# Patient Record
Sex: FEMALE | Race: WHITE | NOT HISPANIC OR LATINO | Employment: PART TIME | URBAN - METROPOLITAN AREA
[De-identification: names, ages, dates, MRNs, and addresses within clinical notes are randomized per-mention and may not be internally consistent; named-entity substitution may affect disease eponyms.]

---

## 2017-11-25 ENCOUNTER — HISTORICAL (OUTPATIENT)
Dept: LAB | Facility: HOSPITAL | Age: 52
End: 2017-11-25

## 2017-11-25 LAB
ABS NEUT (OLG): 3.73
ALBUMIN SERPL-MCNC: 3.5 GM/DL (ref 3.4–5)
ALP SERPL-CCNC: 103 UNIT/L (ref 46–116)
ALT SERPL-CCNC: 19 UNIT/L (ref 12–78)
AST SERPL-CCNC: 8 UNIT/L (ref 10–37)
BASOPHILS # BLD AUTO: 0.04 X10(3)/MCL
BASOPHILS NFR BLD AUTO: 0.6 %
BILIRUB SERPL-MCNC: 0.2 MG/DL (ref 0.2–1)
BILIRUBIN DIRECT+TOT PNL SERPL-MCNC: 0.07 MG/DL (ref 0–0.2)
BILIRUBIN DIRECT+TOT PNL SERPL-MCNC: 0.17 MG/DL
BUN SERPL-MCNC: 15 MG/DL (ref 7–18)
CALCIUM SERPL-MCNC: 9 MG/DL (ref 8.5–10.1)
CHLORIDE SERPL-SCNC: 106 MMOL/L (ref 98–107)
CHOLEST SERPL-MCNC: 244 MG/DL (ref 50–200)
CHOLEST/HDLC SERPL: 4 {RATIO} (ref 0–5)
CO2 SERPL-SCNC: 27.9 MMOL/L (ref 21–32)
CREAT SERPL-MCNC: 0.73 MG/DL (ref 0.55–1.02)
EOSINOPHIL # BLD AUTO: 0.16 X10(3)/MCL
EOSINOPHIL NFR BLD AUTO: 2.2 %
ERYTHROCYTE [DISTWIDTH] IN BLOOD BY AUTOMATED COUNT: 14 %
GLUCOSE SERPL-MCNC: 91 MG/DL (ref 74–106)
HCT VFR BLD AUTO: 40.2 % (ref 34–46)
HDLC SERPL-MCNC: 55 MG/DL (ref 35–60)
HGB BLD-MCNC: 12.9 GM/DL (ref 11.3–15.4)
IMM GRANULOCYTES # BLD AUTO: 0 10*3/UL (ref 0–0.1)
IMM GRANULOCYTES NFR BLD AUTO: 0 % (ref 0–1)
LDLC SERPL CALC-MCNC: 175 MG/DL (ref 50–140)
LYMPHOCYTES # BLD AUTO: 2.91 X10(3)/MCL
LYMPHOCYTES NFR BLD AUTO: 40.5 %
MCH RBC QN AUTO: 29.3 PG (ref 27–33)
MCHC RBC AUTO-ENTMCNC: 32.1 GM/DL (ref 32–35)
MCV RBC AUTO: 91.2 FL (ref 81–97)
MONOCYTES # BLD AUTO: 0.35 X10(3)/MCL
MONOCYTES NFR BLD AUTO: 4.9 %
NEUTROPHILS # BLD AUTO: 3.73 X10(3)/MCL
NEUTROPHILS NFR BLD AUTO: 51.8 %
PLATELET # BLD AUTO: 269 X10(3)/MCL (ref 151–368)
PMV BLD AUTO: 11 FL
POTASSIUM SERPL-SCNC: 4.3 MMOL/L (ref 3.5–5.1)
PROT SERPL-MCNC: 6.8 GM/DL (ref 6.4–8.2)
RBC # BLD AUTO: 4.41 X10(6)/MCL (ref 3.9–5)
SODIUM SERPL-SCNC: 143 MMOL/L (ref 136–145)
TRIGL SERPL-MCNC: 70 MG/DL (ref 30–150)
TSH SERPL-ACNC: 1.07 MIU/ML (ref 0.35–3.75)
VLDLC SERPL CALC-MCNC: 14 MG/DL
WBC # SPEC AUTO: 7.19 X10(3)/MCL (ref 3.4–9.2)

## 2018-02-26 ENCOUNTER — HISTORICAL (OUTPATIENT)
Dept: LAB | Facility: HOSPITAL | Age: 53
End: 2018-02-26

## 2018-02-26 LAB
ALBUMIN SERPL-MCNC: 3.6 GM/DL (ref 3.4–5)
ALP SERPL-CCNC: 113 UNIT/L (ref 46–116)
ALT SERPL-CCNC: 23 UNIT/L (ref 12–78)
AST SERPL-CCNC: 8 UNIT/L (ref 10–37)
BILIRUB SERPL-MCNC: 0.5 MG/DL (ref 0.2–1)
BILIRUBIN DIRECT+TOT PNL SERPL-MCNC: 0.1 MG/DL (ref 0–0.2)
BILIRUBIN DIRECT+TOT PNL SERPL-MCNC: 0.41 MG/DL
CHOLEST SERPL-MCNC: 157 MG/DL (ref 50–200)
CHOLEST/HDLC SERPL: 3 {RATIO} (ref 0–5)
FT4I SERPL CALC-MCNC: 3 (ref 3–4)
HDLC SERPL-MCNC: 47 MG/DL (ref 35–60)
LDLC SERPL CALC-MCNC: 89 MG/DL (ref 50–140)
PROT SERPL-MCNC: 7.2 GM/DL (ref 6.4–8.2)
T3RU NFR SERPL: 32 % (ref 31–39)
T4 SERPL-MCNC: 7.9 MCG/DL (ref 4.7–13.3)
TRIGL SERPL-MCNC: 105 MG/DL (ref 30–150)
TSH SERPL-ACNC: 1.28 MIU/ML (ref 0.35–3.75)
VLDLC SERPL CALC-MCNC: 21 MG/DL

## 2021-06-23 ENCOUNTER — HISTORICAL (OUTPATIENT)
Dept: LAB | Facility: HOSPITAL | Age: 56
End: 2021-06-23

## 2021-06-23 LAB
ABS NEUT (OLG): 2.59
ALBUMIN SERPL-MCNC: 3.8 GM/DL (ref 3.5–5)
ALBUMIN/GLOB SERPL: 1.3 RATIO (ref 1.1–2)
ALP SERPL-CCNC: 81 UNIT/L (ref 40–150)
ALT SERPL-CCNC: 18 UNIT/L (ref 0–55)
AST SERPL-CCNC: 16 UNIT/L (ref 5–34)
BASOPHILS # BLD AUTO: 0.02 X10(3)/MCL
BASOPHILS NFR BLD AUTO: 0.4 %
BILIRUB SERPL-MCNC: 0.4 MG/DL
BILIRUBIN DIRECT+TOT PNL SERPL-MCNC: 0.2 MG/DL
BILIRUBIN DIRECT+TOT PNL SERPL-MCNC: 0.2 MG/DL (ref 0–0.5)
BUN SERPL-MCNC: 16 MG/DL (ref 9.8–20.1)
CALCIUM SERPL-MCNC: 9 MG/DL (ref 8.4–10.2)
CHLORIDE SERPL-SCNC: 107 MMOL/L (ref 98–107)
CHOLEST SERPL-MCNC: 156 MG/DL
CHOLEST/HDLC SERPL: 4 {RATIO} (ref 0–5)
CO2 SERPL-SCNC: 30 MEQ/L (ref 22–29)
CREAT SERPL-MCNC: 0.72 MG/DL (ref 0.55–1.02)
DEPRECATED CALCIDIOL+CALCIFEROL SERPL-MC: 51.1 NG/ML (ref 30–80)
EOSINOPHIL # BLD AUTO: 0.16 X10(3)/MCL
EOSINOPHIL NFR BLD AUTO: 2.8 %
ERYTHROCYTE [DISTWIDTH] IN BLOOD BY AUTOMATED COUNT: 14 %
GLOBULIN SER-MCNC: 2.9 GM/DL (ref 2.4–3.5)
GLUCOSE SERPL-MCNC: 106 MG/DL (ref 74–100)
HCT VFR BLD AUTO: 38.7 % (ref 34–46)
HDLC SERPL-MCNC: 37 MG/DL (ref 35–60)
HGB BLD-MCNC: 12.3 GM/DL (ref 11.3–15.4)
IMM GRANULOCYTES # BLD AUTO: 0.03 10*3/UL (ref 0–0.1)
IMM GRANULOCYTES NFR BLD AUTO: 0.5 % (ref 0–1)
LDLC SERPL CALC-MCNC: 95 MG/DL (ref 50–140)
LYMPHOCYTES # BLD AUTO: 2.51 X10(3)/MCL
LYMPHOCYTES NFR BLD AUTO: 44.5 %
MCH RBC QN AUTO: 30.1 PG (ref 27–33)
MCHC RBC AUTO-ENTMCNC: 31.8 GM/DL (ref 32–35)
MCV RBC AUTO: 94.6 FL (ref 81–97)
MONOCYTES # BLD AUTO: 0.33 X10(3)/MCL
MONOCYTES NFR BLD AUTO: 5.9 %
NEUTROPHILS # BLD AUTO: 2.59 X10(3)/MCL
NEUTROPHILS NFR BLD AUTO: 45.9 %
PLATELET # BLD AUTO: 290 X10(3)/MCL (ref 140–450)
PMV BLD AUTO: 11 FL
POTASSIUM SERPL-SCNC: 4.5 MMOL/L (ref 3.5–5.1)
PROT SERPL-MCNC: 6.7 GM/DL (ref 6.4–8.3)
RBC # BLD AUTO: 4.09 X10(6)/MCL (ref 3.9–5)
SODIUM SERPL-SCNC: 144 MMOL/L (ref 136–145)
TRIGL SERPL-MCNC: 118 MG/DL (ref 37–140)
TSH SERPL-ACNC: 1.39 UIU/ML (ref 0.35–4.94)
VLDLC SERPL CALC-MCNC: 24 MG/DL
WBC # SPEC AUTO: 5.64 X10(3)/MCL (ref 3.4–9.2)

## 2022-12-15 LAB
HUMAN PAPILLOMAVIRUS (HPV): NORMAL
PAP RECOMMENDATION EXT: NORMAL
PAP SMEAR: NORMAL

## 2023-05-02 ENCOUNTER — PATIENT OUTREACH (OUTPATIENT)
Dept: ADMINISTRATIVE | Facility: HOSPITAL | Age: 58
End: 2023-05-02
Payer: MEDICAID

## 2023-05-02 NOTE — LETTER
AUTHORIZATION FOR RELEASE OF   CONFIDENTIAL INFORMATION    Dear Radha Corona,    We are seeing Lorraine Yanes, date of birth 1965, in the clinic at [facility], [place], operated by [facility]. Gallo Jasso MD, is the patient's PCP. Lorraine Yanes has an outstanding lab/procedure at the time we reviewed her chart. In order to help keep her health information updated, she has authorized us to request the following medical record(s):        ( X )  MAMMOGRAM                                      (  )  COLONOSCOPY      (  )  PAP SMEAR                                          (  )  OUTSIDE LAB RESULTS     (  )  DEXA SCAN                                          (  )  EYE EXAM            (  )  FOOT EXAM                                          (  )  ENTIRE RECORD     (  )  OUTSIDE IMMUNIZATIONS                 (  )  _______________         Please fax records to [facility], Scott Bergeaux, MD, [phone]     If you have any questions you can contact Yakelin Bell at [phone].           Patient Name: Lorraine Yanes  : 1965  Patient Phone #: [phone]

## 2023-05-02 NOTE — PROGRESS NOTES
Patient returned my call reporting that she had a total Hyst 2 years ago. Added info to history. Patient is scheduled for mmg tomorrow at [facility]. Reminder set. Patient has never had colonoscopy.

## 2023-05-02 NOTE — PROGRESS NOTES
Population Health. Out Reach. Reviewing patient's chart for quality metrics. I attempted to contact patient to see if she has had a recent mmg, pap smear, or colonoscopy. No answer. Left Message.

## 2023-05-09 LAB — BCS RECOMMENDATION EXT: NORMAL

## 2023-05-09 NOTE — PROGRESS NOTES
The following record(s)  below were uploaded for Health Maintenance .    MAMMOGRAM SCREENING      5/3/23

## 2023-05-12 ENCOUNTER — OFFICE VISIT (OUTPATIENT)
Dept: FAMILY MEDICINE | Facility: CLINIC | Age: 58
End: 2023-05-12
Payer: MEDICAID

## 2023-05-12 VITALS
WEIGHT: 170.38 LBS | OXYGEN SATURATION: 65 % | HEIGHT: 63 IN | RESPIRATION RATE: 20 BRPM | SYSTOLIC BLOOD PRESSURE: 118 MMHG | BODY MASS INDEX: 30.19 KG/M2 | TEMPERATURE: 97 F | DIASTOLIC BLOOD PRESSURE: 80 MMHG | HEART RATE: 98 BPM

## 2023-05-12 DIAGNOSIS — Z12.11 COLON CANCER SCREENING: ICD-10-CM

## 2023-05-12 DIAGNOSIS — I10 HYPERTENSION, UNSPECIFIED TYPE: Primary | ICD-10-CM

## 2023-05-12 DIAGNOSIS — E78.49 OTHER HYPERLIPIDEMIA: ICD-10-CM

## 2023-05-12 DIAGNOSIS — F17.210 CIGARETTE SMOKER: ICD-10-CM

## 2023-05-12 PROCEDURE — 3074F PR MOST RECENT SYSTOLIC BLOOD PRESSURE < 130 MM HG: ICD-10-PCS | Mod: CPTII,,, | Performed by: FAMILY MEDICINE

## 2023-05-12 PROCEDURE — 99406 PR TOBACCO USE CESSATION INTERMEDIATE 3-10 MINUTES: ICD-10-PCS | Mod: ,,, | Performed by: FAMILY MEDICINE

## 2023-05-12 PROCEDURE — 99406 BEHAV CHNG SMOKING 3-10 MIN: CPT | Mod: ,,, | Performed by: FAMILY MEDICINE

## 2023-05-12 PROCEDURE — 1160F PR REVIEW ALL MEDS BY PRESCRIBER/CLIN PHARMACIST DOCUMENTED: ICD-10-PCS | Mod: CPTII,,, | Performed by: FAMILY MEDICINE

## 2023-05-12 PROCEDURE — 3008F BODY MASS INDEX DOCD: CPT | Mod: CPTII,,, | Performed by: FAMILY MEDICINE

## 2023-05-12 PROCEDURE — 1159F MED LIST DOCD IN RCRD: CPT | Mod: CPTII,,, | Performed by: FAMILY MEDICINE

## 2023-05-12 PROCEDURE — 3079F PR MOST RECENT DIASTOLIC BLOOD PRESSURE 80-89 MM HG: ICD-10-PCS | Mod: CPTII,,, | Performed by: FAMILY MEDICINE

## 2023-05-12 PROCEDURE — 3079F DIAST BP 80-89 MM HG: CPT | Mod: CPTII,,, | Performed by: FAMILY MEDICINE

## 2023-05-12 PROCEDURE — 4010F ACE/ARB THERAPY RXD/TAKEN: CPT | Mod: CPTII,,, | Performed by: FAMILY MEDICINE

## 2023-05-12 PROCEDURE — 3074F SYST BP LT 130 MM HG: CPT | Mod: CPTII,,, | Performed by: FAMILY MEDICINE

## 2023-05-12 PROCEDURE — 99204 OFFICE O/P NEW MOD 45 MIN: CPT | Mod: 25,,, | Performed by: FAMILY MEDICINE

## 2023-05-12 PROCEDURE — 1160F RVW MEDS BY RX/DR IN RCRD: CPT | Mod: CPTII,,, | Performed by: FAMILY MEDICINE

## 2023-05-12 PROCEDURE — 4010F PR ACE/ARB THEARPY RXD/TAKEN: ICD-10-PCS | Mod: CPTII,,, | Performed by: FAMILY MEDICINE

## 2023-05-12 PROCEDURE — 3008F PR BODY MASS INDEX (BMI) DOCUMENTED: ICD-10-PCS | Mod: CPTII,,, | Performed by: FAMILY MEDICINE

## 2023-05-12 PROCEDURE — 1159F PR MEDICATION LIST DOCUMENTED IN MEDICAL RECORD: ICD-10-PCS | Mod: CPTII,,, | Performed by: FAMILY MEDICINE

## 2023-05-12 PROCEDURE — 99204 PR OFFICE/OUTPT VISIT, NEW, LEVL IV, 45-59 MIN: ICD-10-PCS | Mod: 25,,, | Performed by: FAMILY MEDICINE

## 2023-05-12 RX ORDER — CARVEDILOL 3.12 MG/1
3.12 TABLET ORAL DAILY
Qty: 30 TABLET | Refills: 3 | Status: SHIPPED | OUTPATIENT
Start: 2023-05-12 | End: 2024-01-22 | Stop reason: SDUPTHER

## 2023-05-12 RX ORDER — LISINOPRIL 10 MG/1
10 TABLET ORAL
COMMUNITY
Start: 2023-02-13 | End: 2023-05-12 | Stop reason: SDUPTHER

## 2023-05-12 RX ORDER — ROSUVASTATIN CALCIUM 20 MG/1
20 TABLET, COATED ORAL NIGHTLY
COMMUNITY
Start: 2023-02-13 | End: 2023-05-12 | Stop reason: SDUPTHER

## 2023-05-12 RX ORDER — CARVEDILOL 3.12 MG/1
3.12 TABLET ORAL
COMMUNITY
Start: 2023-02-13 | End: 2023-05-12 | Stop reason: SDUPTHER

## 2023-05-12 RX ORDER — ROSUVASTATIN CALCIUM 20 MG/1
20 TABLET, COATED ORAL NIGHTLY
Qty: 30 TABLET | Refills: 3 | Status: SHIPPED | OUTPATIENT
Start: 2023-05-12 | End: 2024-01-22 | Stop reason: SDUPTHER

## 2023-05-12 RX ORDER — LISINOPRIL 10 MG/1
10 TABLET ORAL DAILY
Qty: 30 TABLET | Refills: 3 | Status: SHIPPED | OUTPATIENT
Start: 2023-05-12 | End: 2024-01-22 | Stop reason: SDUPTHER

## 2023-05-12 NOTE — LETTER
AUTHORIZATION FOR RELEASE OF   CONFIDENTIAL INFORMATION    Dear Dr Bucio,    We are seeing Lorraine Yanes, date of birth 1965, in the clinic at [facility]. Gallo Jasso MD is the patient's PCP. Lorraine Yanes has an outstanding lab/procedure at the time we reviewed her chart. In order to help keep her health information updated, she has authorized us to request the following medical record(s):        (  x)  MAMMOGRAM                                      (  )  COLONOSCOPY      ( x )  PAP SMEAR                                          (  )  OUTSIDE LAB RESULTS     (  )  DEXA SCAN                                          (  )  EYE EXAM            (  )  FOOT EXAM                                          (  )  ENTIRE RECORD     (  )  OUTSIDE IMMUNIZATIONS                 (  x)  _surgery note hysterectomy___         Please fax records to [facility], Scott James Bergeaux, MD, [phone]          Patient Name: Lorraine Yanes  : 1965  Patient Phone #: [phone]

## 2023-05-12 NOTE — PROGRESS NOTES
"Subjective:       Patient ID: Lorraine Yanes is a 57 y.o. female.    Chief Complaint: Establish Care (Mammo done at [facility]- requested already)      Establish care    Hypertension  - tolerating medication (no side effects)  - no headaches  - patient without any complaints    Hyperlipidemia  - tolerating medication (no side effects)   - no myalgia  - watching diet    Review of Systems   Constitutional: Negative.    Respiratory: Negative.     Cardiovascular: Negative.         Followed by Dr. Siddiqi in the past but no longer taking current insurance    Gastrointestinal: Negative.    Genitourinary:         Annual Juanis with Dr. Bucio; history of total hysterectomy   Psychiatric/Behavioral: Negative.          Tobacco use: 0.5 pk/day/40 years         Objective:      /80 (BP Location: Left arm, Patient Position: Sitting, BP Method: Large (Manual))   Pulse 98   Temp 96.5 °F (35.8 °C)   Resp 20   Ht 5' 3" (1.6 m)   Wt 77.3 kg (170 lb 6.4 oz)   SpO2 (!) 65%   BMI 30.19 kg/m²    Physical Exam  Constitutional:       Appearance: Normal appearance.   Cardiovascular:      Rate and Rhythm: Normal rate and regular rhythm.   Pulmonary:      Effort: Pulmonary effort is normal.      Breath sounds: Normal breath sounds.   Abdominal:      General: Abdomen is flat. Bowel sounds are normal.      Palpations: Abdomen is soft.   Skin:     General: Skin is warm.   Neurological:      Mental Status: She is alert.   Psychiatric:         Mood and Affect: Mood normal.         Behavior: Behavior normal.         Thought Content: Thought content normal.         Judgment: Judgment normal.             Assessment:       Problem List Items Addressed This Visit          Cardiac/Vascular    Other hyperlipidemia    Relevant Medications    rosuvastatin (CRESTOR) 20 MG tablet    Hypertension - Primary    Relevant Medications    carvediloL (COREG) 3.125 MG tablet    lisinopriL 10 MG tablet    Other Relevant Orders    Ambulatory " referral/consult to Cardiology     Other Visit Diagnoses       Cigarette smoker        Relevant Orders    CT Chest Lung Screening Low Dose    Colon cancer screening        Relevant Orders    Cologuard Screening (Multitarget Stool DNA)                 Plan:   1. Hypertension, unspecified type  -     Ambulatory referral/consult to Cardiology; Future; Expected date: 05/19/2023  -     carvediloL (COREG) 3.125 MG tablet; Take 1 tablet (3.125 mg total) by mouth once daily.  Dispense: 30 tablet; Refill: 3  -     lisinopriL 10 MG tablet; Take 1 tablet (10 mg total) by mouth once daily.  Dispense: 30 tablet; Refill: 3  Continue current medication  Rx sent to pharmacy   Refer patient to NP Nini    2. Other hyperlipidemia  -     rosuvastatin (CRESTOR) 20 MG tablet; Take 1 tablet (20 mg total) by mouth every evening.  Dispense: 30 tablet; Refill: 3  Continue current medication  Rx sent to pharmacy   Get copy of most recent FLP     3. Cigarette smoker  -     CT Chest Lung Screening Low Dose; Future; Expected date: 05/12/2023  Adverse affects of tobacco discussed; 5 minutes spent counseling  Patient defers medication/tobacco cessation program  Patient encouraged to remain abstinent from tobacco products  Return to clinic with any concerns    4. Colon cancer screening  -     Cologuard Screening (Multitarget Stool DNA); Future; Expected date: 05/12/2023  Patient defers colonoscopy; schedule Cologuard

## 2023-05-23 ENCOUNTER — HOSPITAL ENCOUNTER (OUTPATIENT)
Dept: RADIOLOGY | Facility: HOSPITAL | Age: 58
Discharge: HOME | End: 2023-05-23
Attending: FAMILY MEDICINE
Payer: MEDICAID

## 2023-05-23 DIAGNOSIS — F17.210 CIGARETTE SMOKER: ICD-10-CM

## 2023-05-23 PROCEDURE — 71271 CT THORAX LUNG CANCER SCR C-: CPT | Mod: TC

## 2023-05-24 ENCOUNTER — TELEPHONE (OUTPATIENT)
Dept: FAMILY MEDICINE | Facility: CLINIC | Age: 58
End: 2023-05-24
Payer: MEDICAID

## 2023-05-24 NOTE — TELEPHONE ENCOUNTER
----- Message from Gallo Jasso MD sent at 5/23/2023 12:28 PM CDT -----  Normal lung cancer screening.  Repeat in 1 year

## 2023-05-28 LAB — NONINV COLON CA DNA+OCC BLD SCRN STL QL: NEGATIVE

## 2023-05-30 ENCOUNTER — TELEPHONE (OUTPATIENT)
Dept: FAMILY MEDICINE | Facility: CLINIC | Age: 58
End: 2023-05-30
Payer: MEDICAID

## 2023-05-30 NOTE — TELEPHONE ENCOUNTER
----- Message from Gallo Jasso MD sent at 5/30/2023  1:24 PM CDT -----  Negative (normal) Cologuard. Repeat in 3 years.

## 2023-05-30 NOTE — PROGRESS NOTES
The following record(s)  below were uploaded for Health Maintenance .          PAP SMEAR   12/15/22  HPV SCREENING  12/15/22

## 2023-11-29 ENCOUNTER — HOSPITAL ENCOUNTER (EMERGENCY)
Facility: HOSPITAL | Age: 58
Discharge: HOME | End: 2023-11-29
Attending: FAMILY MEDICINE
Payer: MEDICAID

## 2023-11-29 VITALS
TEMPERATURE: 98 F | SYSTOLIC BLOOD PRESSURE: 155 MMHG | BODY MASS INDEX: 31.28 KG/M2 | HEART RATE: 70 BPM | DIASTOLIC BLOOD PRESSURE: 80 MMHG | OXYGEN SATURATION: 98 % | HEIGHT: 62 IN | WEIGHT: 170 LBS | RESPIRATION RATE: 18 BRPM

## 2023-11-29 DIAGNOSIS — S92.524A: Primary | ICD-10-CM

## 2023-11-29 DIAGNOSIS — R52 PAIN: ICD-10-CM

## 2023-11-29 PROCEDURE — 99283 EMERGENCY DEPT VISIT LOW MDM: CPT

## 2023-11-29 NOTE — ED PROVIDER NOTES
Encounter Date: 11/29/2023       History     Chief Complaint   Patient presents with    Toe Pain     Dropped plate on L foot across toes x 5 days ago, states pain worsening      This patient is a 58-year-old female who states that she dropped a plate on her right foot, across all 5 toes 5 days ago.  She states that the pain in her 3rd toe has gotten worse and there is ecchymosis across the whole toe.  She does not have any problems walking she just wants to make sure that is not broken    The history is provided by the patient.     Review of patient's allergies indicates:  No Known Allergies  Past Medical History:   Diagnosis Date    Abnormal mammogram of right breast     Broken heart syndrome     HPV in female     Hyperlipidemia     Hypertension     Plantar fasciitis      Past Surgical History:   Procedure Laterality Date    APPENDECTOMY      TONSILLECTOMY      TOTAL ABDOMINAL HYSTERECTOMY      2 years ago     Family History   Problem Relation Age of Onset    Heart attack Mother     Hypertension Father      Social History     Tobacco Use    Smoking status: Every Day     Current packs/day: 0.50     Average packs/day: 0.5 packs/day for 40.0 years (20.0 ttl pk-yrs)     Types: Cigarettes    Smokeless tobacco: Never   Substance Use Topics    Alcohol use: Never    Drug use: Never     Review of Systems   Constitutional: Negative.    HENT: Negative.     Respiratory: Negative.     Cardiovascular: Negative.    Endocrine: Negative.    Musculoskeletal:         Left 3rd toe pain   Skin: Negative.    Neurological: Negative.    Psychiatric/Behavioral: Negative.     All other systems reviewed and are negative.      Physical Exam     Initial Vitals [11/29/23 0743]   BP Pulse Resp Temp SpO2   (!) 161/82 64 18 97.6 °F (36.4 °C) 100 %      MAP       --         Physical Exam    Nursing note and vitals reviewed.  Constitutional: She appears well-developed.   HENT:   Head: Normocephalic.   Eyes: Pupils are equal, round, and reactive to  light.   Neck:   Normal range of motion.  Cardiovascular:  Normal rate, regular rhythm and normal heart sounds.           Pulmonary/Chest: Breath sounds normal.   Abdominal: Abdomen is soft.   Musculoskeletal:         General: Normal range of motion.      Cervical back: Normal range of motion.        Feet:       Comments: Ecchymosis and swelling of the right 3rd toe.     Neurological: She is alert and oriented to person, place, and time.   Skin: Skin is warm and dry.   Psychiatric: She has a normal mood and affect.         ED Course   Procedures  Labs Reviewed - No data to display       Imaging Results              X-Ray Foot Complete Right (In process)                      Medications - No data to display  Medical Decision Making  This patient is a 58-year-old female who states that she dropped a plate on her right foot 5 days ago and has swelling and ecchymosis of the right 3rd toe.  States that the pain is worse when it initially happened.  Differential diagnosis:  3rd toe fracture, phalanx fracture, contusion of the right 3rd toe    Amount and/or Complexity of Data Reviewed  Radiology: ordered.     Details: X-ray shows a slight fracture in the right 3rd toe                                      Clinical Impression:  Final diagnoses:  [R52] Pain  [S92.524A] Nondisp fx of medial phalanx of right lesser toe(s), init (Primary)          ED Disposition Condition    Discharge Stable          ED Prescriptions    None       Follow-up Information       Follow up With Specialties Details Why Contact Info    Gallo Jasso MD Family Medicine   [address] [place] [address]  [phone]               Mohit Carbajal MD  11/29/23 1434

## 2024-01-22 ENCOUNTER — OFFICE VISIT (OUTPATIENT)
Dept: FAMILY MEDICINE | Facility: CLINIC | Age: 59
End: 2024-01-22
Payer: MEDICAID

## 2024-01-22 VITALS
TEMPERATURE: 97 F | BODY MASS INDEX: 29.44 KG/M2 | HEIGHT: 62 IN | DIASTOLIC BLOOD PRESSURE: 74 MMHG | OXYGEN SATURATION: 98 % | SYSTOLIC BLOOD PRESSURE: 138 MMHG | RESPIRATION RATE: 18 BRPM | HEART RATE: 65 BPM | WEIGHT: 160 LBS

## 2024-01-22 DIAGNOSIS — I10 HYPERTENSION, UNSPECIFIED TYPE: Primary | ICD-10-CM

## 2024-01-22 DIAGNOSIS — E78.49 OTHER HYPERLIPIDEMIA: ICD-10-CM

## 2024-01-22 PROCEDURE — 99214 OFFICE O/P EST MOD 30 MIN: CPT | Mod: ,,, | Performed by: FAMILY MEDICINE

## 2024-01-22 PROCEDURE — 1159F MED LIST DOCD IN RCRD: CPT | Mod: CPTII,,, | Performed by: FAMILY MEDICINE

## 2024-01-22 PROCEDURE — 1160F RVW MEDS BY RX/DR IN RCRD: CPT | Mod: CPTII,,, | Performed by: FAMILY MEDICINE

## 2024-01-22 PROCEDURE — 3008F BODY MASS INDEX DOCD: CPT | Mod: CPTII,,, | Performed by: FAMILY MEDICINE

## 2024-01-22 PROCEDURE — 3075F SYST BP GE 130 - 139MM HG: CPT | Mod: CPTII,,, | Performed by: FAMILY MEDICINE

## 2024-01-22 PROCEDURE — 3078F DIAST BP <80 MM HG: CPT | Mod: CPTII,,, | Performed by: FAMILY MEDICINE

## 2024-01-22 RX ORDER — ROSUVASTATIN CALCIUM 20 MG/1
20 TABLET, COATED ORAL NIGHTLY
Qty: 30 TABLET | Refills: 3 | Status: SHIPPED | OUTPATIENT
Start: 2024-01-22 | End: 2024-05-21

## 2024-01-22 RX ORDER — CARVEDILOL 3.12 MG/1
3.12 TABLET ORAL DAILY
Qty: 30 TABLET | Refills: 3 | Status: SHIPPED | OUTPATIENT
Start: 2024-01-22 | End: 2024-05-21

## 2024-01-22 RX ORDER — LISINOPRIL 10 MG/1
10 TABLET ORAL DAILY
Qty: 30 TABLET | Refills: 3 | Status: SHIPPED | OUTPATIENT
Start: 2024-01-22 | End: 2024-05-21

## 2024-01-22 NOTE — PROGRESS NOTES
"Subjective:       Patient ID: Lorraine Yanes is a 58 y.o. female.    Chief Complaint: 6 month       Routine      Hypertension  - tolerating medication (no side effects)  - no headaches  - patient without any complaints    Hyperlipidemia  - tolerating medication (no side effects)   - no myalgia  - watching diet    Review of Systems   Constitutional: Negative.    Respiratory: Negative.     Cardiovascular: Negative.    Gastrointestinal: Negative.    Psychiatric/Behavioral: Negative.             Objective:      /74 (BP Location: Left arm, Patient Position: Sitting, BP Method: Large (Manual))   Pulse 65   Temp 97.3 °F (36.3 °C)   Resp 18   Ht 5' 2" (1.575 m)   Wt 72.6 kg (160 lb)   SpO2 98%   BMI 29.26 kg/m²    Physical Exam  Constitutional:       Appearance: Normal appearance.   Cardiovascular:      Rate and Rhythm: Normal rate and regular rhythm.      Heart sounds: Normal heart sounds.   Pulmonary:      Effort: Pulmonary effort is normal.      Breath sounds: Normal breath sounds.   Neurological:      Mental Status: She is alert.   Psychiatric:         Mood and Affect: Mood normal.         Behavior: Behavior normal.         Thought Content: Thought content normal.         Judgment: Judgment normal.               Assessment:       Problem List Items Addressed This Visit          Cardiac/Vascular    Other hyperlipidemia    Relevant Medications    rosuvastatin (CRESTOR) 20 MG tablet    Other Relevant Orders    Lipid Panel    Hypertension - Primary    Relevant Medications    carvediloL (COREG) 3.125 MG tablet    lisinopriL 10 MG tablet    Other Relevant Orders    Comprehensive Metabolic Panel          Plan:   1. Hypertension, unspecified type  -     Comprehensive Metabolic Panel; Future; Expected date: 01/22/2024  -     carvediloL (COREG) 3.125 MG tablet; Take 1 tablet (3.125 mg total) by mouth once daily.  Dispense: 30 tablet; Refill: 3  -     lisinopriL 10 MG tablet; Take 1 tablet (10 mg total) by mouth once " daily.  Dispense: 30 tablet; Refill: 3  Controlled  Continue current Rx medication  Check lab work  Return to clinic with any concerns    2. Other hyperlipidemia  -     Lipid Panel; Future; Expected date: 01/22/2024  -     rosuvastatin (CRESTOR) 20 MG tablet; Take 1 tablet (20 mg total) by mouth every evening.  Dispense: 30 tablet; Refill: 3  Continue current medication  Check FLP

## 2024-01-23 ENCOUNTER — LAB VISIT (OUTPATIENT)
Dept: LAB | Facility: HOSPITAL | Age: 59
End: 2024-01-23
Attending: FAMILY MEDICINE
Payer: MEDICAID

## 2024-01-23 DIAGNOSIS — I10 HYPERTENSION, UNSPECIFIED TYPE: ICD-10-CM

## 2024-01-23 DIAGNOSIS — R74.8 ELEVATED LIVER ENZYMES: Primary | ICD-10-CM

## 2024-01-23 DIAGNOSIS — E78.49 OTHER HYPERLIPIDEMIA: ICD-10-CM

## 2024-01-23 LAB
ALBUMIN SERPL-MCNC: 3.8 G/DL (ref 3.4–4.8)
ALBUMIN/GLOB SERPL: 1.1 RATIO (ref 1.1–2)
ALP SERPL-CCNC: 167 UNIT/L (ref 40–150)
ALT SERPL-CCNC: 26 UNIT/L (ref 0–55)
AST SERPL-CCNC: 22 UNIT/L (ref 5–34)
BILIRUB SERPL-MCNC: 0.4 MG/DL
BUN SERPL-MCNC: 14 MG/DL (ref 9.8–20.1)
CALCIUM SERPL-MCNC: 9.6 MG/DL (ref 8.4–10.2)
CHLORIDE SERPL-SCNC: 105 MMOL/L (ref 98–107)
CHOLEST SERPL-MCNC: 183 MG/DL
CHOLEST/HDLC SERPL: 4 {RATIO} (ref 0–5)
CO2 SERPL-SCNC: 29 MMOL/L (ref 23–31)
CREAT SERPL-MCNC: 0.77 MG/DL (ref 0.55–1.02)
GFR SERPLBLD CREATININE-BSD FMLA CKD-EPI: >60 MLS/MIN/1.73/M2
GLOBULIN SER-MCNC: 3.4 GM/DL (ref 2.4–3.5)
GLUCOSE SERPL-MCNC: 116 MG/DL (ref 82–115)
HDLC SERPL-MCNC: 43 MG/DL (ref 35–60)
LDLC SERPL CALC-MCNC: 114 MG/DL (ref 50–140)
POTASSIUM SERPL-SCNC: 4.1 MMOL/L (ref 3.5–5.1)
PROT SERPL-MCNC: 7.2 GM/DL (ref 5.8–7.6)
SODIUM SERPL-SCNC: 141 MMOL/L (ref 136–145)
TRIGL SERPL-MCNC: 128 MG/DL (ref 37–140)
VLDLC SERPL CALC-MCNC: 26 MG/DL

## 2024-01-23 PROCEDURE — 36415 COLL VENOUS BLD VENIPUNCTURE: CPT

## 2024-01-23 PROCEDURE — 80061 LIPID PANEL: CPT

## 2024-01-23 PROCEDURE — 80053 COMPREHEN METABOLIC PANEL: CPT

## 2024-03-28 DIAGNOSIS — Z72.0 TOBACCO USER: Primary | ICD-10-CM

## 2024-03-28 RX ORDER — IBUPROFEN 200 MG
1 TABLET ORAL DAILY
Qty: 42 PATCH | Refills: 0 | Status: SHIPPED | OUTPATIENT
Start: 2024-03-28 | End: 2024-05-09

## 2024-03-28 RX ORDER — NICOTINE 7MG/24HR
1 PATCH, TRANSDERMAL 24 HOURS TRANSDERMAL DAILY
Qty: 14 PATCH | Refills: 0 | Status: SHIPPED | OUTPATIENT
Start: 2024-03-28 | End: 2024-04-11

## 2024-03-28 RX ORDER — IBUPROFEN 200 MG
1 TABLET ORAL DAILY
Qty: 14 PATCH | Refills: 0 | Status: SHIPPED | OUTPATIENT
Start: 2024-03-28 | End: 2024-04-11

## 2024-04-22 DIAGNOSIS — Z13.6 SCREENING FOR ISCHEMIC HEART DISEASE: ICD-10-CM

## 2024-04-22 DIAGNOSIS — Z11.4 ENCOUNTER FOR HIV (HUMAN IMMUNODEFICIENCY VIRUS) TEST: Primary | ICD-10-CM

## 2024-04-22 DIAGNOSIS — Z11.59 NEED FOR HEPATITIS C SCREENING TEST: ICD-10-CM

## 2024-04-22 DIAGNOSIS — Z00.00 WELLNESS EXAMINATION: ICD-10-CM

## 2024-05-06 LAB — BCS RECOMMENDATION EXT: NORMAL

## 2024-05-16 ENCOUNTER — LAB VISIT (OUTPATIENT)
Dept: LAB | Facility: HOSPITAL | Age: 59
End: 2024-05-16
Attending: FAMILY MEDICINE
Payer: MEDICAID

## 2024-05-16 DIAGNOSIS — Z13.6 SCREENING FOR ISCHEMIC HEART DISEASE: ICD-10-CM

## 2024-05-16 DIAGNOSIS — R74.8 ELEVATED LIVER ENZYMES: ICD-10-CM

## 2024-05-16 DIAGNOSIS — Z11.59 NEED FOR HEPATITIS C SCREENING TEST: ICD-10-CM

## 2024-05-16 DIAGNOSIS — Z11.4 ENCOUNTER FOR HIV (HUMAN IMMUNODEFICIENCY VIRUS) TEST: ICD-10-CM

## 2024-05-16 DIAGNOSIS — Z00.00 WELLNESS EXAMINATION: ICD-10-CM

## 2024-05-16 LAB
ALBUMIN SERPL-MCNC: 3.8 G/DL (ref 3.4–4.8)
ALBUMIN/GLOB SERPL: 1.2 RATIO (ref 1.1–2)
ALP SERPL-CCNC: 127 UNIT/L (ref 40–150)
ALT SERPL-CCNC: 24 UNIT/L (ref 0–55)
AST SERPL-CCNC: 17 UNIT/L (ref 5–34)
BASOPHILS # BLD AUTO: 0.03 X10(3)/MCL
BASOPHILS NFR BLD AUTO: 0.4 %
BILIRUB DIRECT SERPL-MCNC: 0.1 MG/DL (ref 0–?)
BILIRUB SERPL-MCNC: 0.2 MG/DL
BUN SERPL-MCNC: 13 MG/DL (ref 9.8–20.1)
CALCIUM SERPL-MCNC: 9.6 MG/DL (ref 8.4–10.2)
CHLORIDE SERPL-SCNC: 108 MMOL/L (ref 98–107)
CHOLEST SERPL-MCNC: 183 MG/DL
CHOLEST/HDLC SERPL: 4 {RATIO} (ref 0–5)
CO2 SERPL-SCNC: 28 MMOL/L (ref 23–31)
CREAT SERPL-MCNC: 0.77 MG/DL (ref 0.55–1.02)
EOSINOPHIL # BLD AUTO: 0.16 X10(3)/MCL (ref 0–0.9)
EOSINOPHIL NFR BLD AUTO: 2.3 %
ERYTHROCYTE [DISTWIDTH] IN BLOOD BY AUTOMATED COUNT: 13.5 % (ref 11.5–17)
GFR SERPLBLD CREATININE-BSD FMLA CKD-EPI: >60 ML/MIN/1.73/M2
GLOBULIN SER-MCNC: 3.2 GM/DL (ref 2.4–3.5)
GLUCOSE SERPL-MCNC: 124 MG/DL (ref 82–115)
HCT VFR BLD AUTO: 39.4 % (ref 37–47)
HCV AB SERPL QL IA: NONREACTIVE
HDLC SERPL-MCNC: 49 MG/DL (ref 35–60)
HGB BLD-MCNC: 12.7 G/DL (ref 12–16)
HIV 1+2 AB+HIV1 P24 AG SERPL QL IA: NONREACTIVE
IMM GRANULOCYTES # BLD AUTO: 0.01 X10(3)/MCL (ref 0–0.04)
IMM GRANULOCYTES NFR BLD AUTO: 0.1 %
LDLC SERPL CALC-MCNC: 112 MG/DL (ref 50–140)
LYMPHOCYTES # BLD AUTO: 2.7 X10(3)/MCL (ref 0.6–4.6)
LYMPHOCYTES NFR BLD AUTO: 39.1 %
MCH RBC QN AUTO: 29.5 PG (ref 27–31)
MCHC RBC AUTO-ENTMCNC: 32.2 G/DL (ref 33–36)
MCV RBC AUTO: 91.6 FL (ref 80–94)
MONOCYTES # BLD AUTO: 0.28 X10(3)/MCL (ref 0.1–1.3)
MONOCYTES NFR BLD AUTO: 4.1 %
NEUTROPHILS # BLD AUTO: 3.73 X10(3)/MCL (ref 2.1–9.2)
NEUTROPHILS NFR BLD AUTO: 54 %
NRBC BLD AUTO-RTO: 0 %
PLATELET # BLD AUTO: 304 X10(3)/MCL (ref 130–400)
PMV BLD AUTO: 10.8 FL (ref 7.4–10.4)
POTASSIUM SERPL-SCNC: 4.1 MMOL/L (ref 3.5–5.1)
PROT SERPL-MCNC: 7 GM/DL (ref 5.8–7.6)
RBC # BLD AUTO: 4.3 X10(6)/MCL (ref 4.2–5.4)
SODIUM SERPL-SCNC: 143 MMOL/L (ref 136–145)
TRIGL SERPL-MCNC: 108 MG/DL (ref 37–140)
VLDLC SERPL CALC-MCNC: 22 MG/DL
WBC # SPEC AUTO: 6.91 X10(3)/MCL (ref 4.5–11.5)

## 2024-05-16 PROCEDURE — 85025 COMPLETE CBC W/AUTO DIFF WBC: CPT

## 2024-05-16 PROCEDURE — 87389 HIV-1 AG W/HIV-1&-2 AB AG IA: CPT

## 2024-05-16 PROCEDURE — 82248 BILIRUBIN DIRECT: CPT

## 2024-05-16 PROCEDURE — 36415 COLL VENOUS BLD VENIPUNCTURE: CPT

## 2024-05-16 PROCEDURE — 80053 COMPREHEN METABOLIC PANEL: CPT

## 2024-05-16 PROCEDURE — 86803 HEPATITIS C AB TEST: CPT

## 2024-05-16 PROCEDURE — 80061 LIPID PANEL: CPT

## 2024-05-20 ENCOUNTER — OFFICE VISIT (OUTPATIENT)
Dept: FAMILY MEDICINE | Facility: CLINIC | Age: 59
End: 2024-05-20
Payer: MEDICAID

## 2024-05-20 ENCOUNTER — PATIENT OUTREACH (OUTPATIENT)
Dept: ADMINISTRATIVE | Facility: HOSPITAL | Age: 59
End: 2024-05-20
Payer: MEDICAID

## 2024-05-20 VITALS
RESPIRATION RATE: 18 BRPM | DIASTOLIC BLOOD PRESSURE: 62 MMHG | WEIGHT: 165 LBS | OXYGEN SATURATION: 99 % | HEIGHT: 62 IN | BODY MASS INDEX: 30.36 KG/M2 | SYSTOLIC BLOOD PRESSURE: 124 MMHG | HEART RATE: 59 BPM | TEMPERATURE: 97 F

## 2024-05-20 DIAGNOSIS — Z00.00 WELLNESS EXAMINATION: Primary | ICD-10-CM

## 2024-05-20 DIAGNOSIS — F17.210 CIGARETTE SMOKER: ICD-10-CM

## 2024-05-20 PROCEDURE — 3008F BODY MASS INDEX DOCD: CPT | Mod: CPTII,,, | Performed by: FAMILY MEDICINE

## 2024-05-20 PROCEDURE — 3074F SYST BP LT 130 MM HG: CPT | Mod: CPTII,,, | Performed by: FAMILY MEDICINE

## 2024-05-20 PROCEDURE — 4010F ACE/ARB THERAPY RXD/TAKEN: CPT | Mod: CPTII,,, | Performed by: FAMILY MEDICINE

## 2024-05-20 PROCEDURE — 99396 PREV VISIT EST AGE 40-64: CPT | Mod: ,,, | Performed by: FAMILY MEDICINE

## 2024-05-20 PROCEDURE — 1159F MED LIST DOCD IN RCRD: CPT | Mod: CPTII,,, | Performed by: FAMILY MEDICINE

## 2024-05-20 PROCEDURE — 3078F DIAST BP <80 MM HG: CPT | Mod: CPTII,,, | Performed by: FAMILY MEDICINE

## 2024-05-20 PROCEDURE — 1160F RVW MEDS BY RX/DR IN RCRD: CPT | Mod: CPTII,,, | Performed by: FAMILY MEDICINE

## 2024-05-20 NOTE — LETTER
AUTHORIZATION FOR RELEASE OF   CONFIDENTIAL INFORMATION    Dear Radha Corona, Fax [phone]    We are seeing Lorraine Yanes, date of birth 1965, in the clinic at [facility]. Gallo Jasso MD is the patient's PCP. Lorraine Yanes has an outstanding lab/procedure at the time we reviewed her chart. In order to help keep her health information updated, she has authorized us to request the following medical record(s):        ( X )  MAMMOGRAM                                      (  )  COLONOSCOPY      (  )  PAP SMEAR                                          (  )  OUTSIDE LAB RESULTS     (  )  DEXA SCAN                                          (  )  EYE EXAM            (  )  FOOT EXAM                                          (  )  ENTIRE RECORD     (  )  OUTSIDE IMMUNIZATIONS                 (  )  _______________         Please fax records to [facility], Bergeaux, Scott J, MD, [phone] or [phone].       If you have any questions you can contact Yakelinblaze Bell at [phone].            Patient Name: Lorraine Yanes  : 1965  Patient Phone #: [phone]

## 2024-05-20 NOTE — PROGRESS NOTES
Health Maintenance Topic(s) Outreach Outcomes & Actions Taken:    Breast Cancer Screening - Outreach Outcomes & Actions Taken  : External Records Requested & Care Team Updated if Applicable and WALI sent to [facility]     Additional Notes:

## 2024-05-20 NOTE — PROGRESS NOTES
Records Received, hyper-linked into chart at this time. The following record(s)  below were uploaded for Health Maintenance .             5/20/2024 MAMMOGRAM SCREENING

## 2024-05-20 NOTE — PROGRESS NOTES
"Subjective:      Patient ID: Lorraine Yanes is a 58 y.o. female.    Chief Complaint: wellness and mammo requested      Wellness        Review of Systems   Constitutional: Negative.    Respiratory: Negative.     Cardiovascular: Negative.    Gastrointestinal: Negative.    Psychiatric/Behavioral: Negative.          Tobacco use: 1/2 pk/day/40 years         Objective:     /62 (BP Location: Left arm, Patient Position: Sitting, BP Method: Large (Manual))   Pulse (!) 59   Temp 97.3 °F (36.3 °C)   Resp 18   Ht 5' 2" (1.575 m)   Wt 74.8 kg (165 lb)   SpO2 99%   BMI 30.18 kg/m²    Physical Exam  Constitutional:       Appearance: Normal appearance.   Cardiovascular:      Rate and Rhythm: Normal rate and regular rhythm.   Pulmonary:      Effort: Pulmonary effort is normal.      Breath sounds: Normal breath sounds.   Abdominal:      General: Abdomen is flat. Bowel sounds are normal.      Palpations: Abdomen is soft.   Neurological:      Mental Status: She is alert.   Psychiatric:         Mood and Affect: Mood normal.         Behavior: Behavior normal.         Thought Content: Thought content normal.         Judgment: Judgment normal.       Recent Results (from the past 504 hour(s))   Comprehensive Metabolic Panel    Collection Time: 05/16/24  6:40 AM   Result Value Ref Range    Sodium 143 136 - 145 mmol/L    Potassium 4.1 3.5 - 5.1 mmol/L    Chloride 108 (H) 98 - 107 mmol/L    CO2 28 23 - 31 mmol/L    Glucose 124 (H) 82 - 115 mg/dL    Blood Urea Nitrogen 13.0 9.8 - 20.1 mg/dL    Creatinine 0.77 0.55 - 1.02 mg/dL    Calcium 9.6 8.4 - 10.2 mg/dL    Protein Total 7.0 5.8 - 7.6 gm/dL    Albumin 3.8 3.4 - 4.8 g/dL    Globulin 3.2 2.4 - 3.5 gm/dL    Albumin/Globulin Ratio 1.2 1.1 - 2.0 ratio    Bilirubin Total 0.2 <=1.5 mg/dL     40 - 150 unit/L    ALT 24 0 - 55 unit/L    AST 17 5 - 34 unit/L    eGFR >60 mL/min/1.73/m2   Hepatitis C Antibody    Collection Time: 05/16/24  6:40 AM   Result Value Ref Range    Hep C Ab " Interp Nonreactive Nonreactive   HIV 1/2 Ag/Ab (4th Gen)    Collection Time: 05/16/24  6:40 AM   Result Value Ref Range    HIV Nonreactive Nonreactive   Lipid Panel    Collection Time: 05/16/24  6:40 AM   Result Value Ref Range    Cholesterol Total 183 <=200 mg/dL    HDL Cholesterol 49 35 - 60 mg/dL    Triglyceride 108 37 - 140 mg/dL    Cholesterol/HDL Ratio 4 0 - 5    Very Low Density Lipoprotein 22     LDL Cholesterol 112.00 50.00 - 140.00 mg/dL   CBC with Differential    Collection Time: 05/16/24  6:40 AM   Result Value Ref Range    WBC 6.91 4.50 - 11.50 x10(3)/mcL    RBC 4.30 4.20 - 5.40 x10(6)/mcL    Hgb 12.7 12.0 - 16.0 g/dL    Hct 39.4 37.0 - 47.0 %    MCV 91.6 80.0 - 94.0 fL    MCH 29.5 27.0 - 31.0 pg    MCHC 32.2 (L) 33.0 - 36.0 g/dL    RDW 13.5 11.5 - 17.0 %    Platelet 304 130 - 400 x10(3)/mcL    MPV 10.8 (H) 7.4 - 10.4 fL    Neut % 54.0 %    Lymph % 39.1 %    Mono % 4.1 %    Eos % 2.3 %    Basophil % 0.4 %    Lymph # 2.70 0.6 - 4.6 x10(3)/mcL    Neut # 3.73 2.1 - 9.2 x10(3)/mcL    Mono # 0.28 0.1 - 1.3 x10(3)/mcL    Eos # 0.16 0 - 0.9 x10(3)/mcL    Baso # 0.03 <=0.2 x10(3)/mcL    IG# 0.01 0 - 0.04 x10(3)/mcL    IG% 0.1 %    NRBC% 0.0 %   Bilirubin, Direct    Collection Time: 05/16/24  6:40 AM   Result Value Ref Range    Bilirubin Direct 0.1 0.0 - <0.5 mg/dL            Assessment:     Problem List Items Addressed This Visit    None  Visit Diagnoses       Wellness examination    -  Primary    Relevant Orders    CT Chest Lung Screening Low Dose    Cigarette smoker        Relevant Orders    CT Chest Lung Screening Low Dose             Plan:   1. Wellness examination  -     CT Chest Lung Screening Low Dose; Future; Expected date: 05/20/2024  Lab work discussed with patient  Continue current medication  Continue diet/exercise  Get copy of recent mammogram performed at [facility]  Return to clinic with any concerns    2. Cigarette smoker  -     CT Chest Lung Screening Low Dose; Future; Expected date:  05/20/2024  Schedule lung cancer screening   Patient encouraged to remain abstinent from tobacco products

## 2024-05-30 ENCOUNTER — HOSPITAL ENCOUNTER (OUTPATIENT)
Dept: RADIOLOGY | Facility: HOSPITAL | Age: 59
Discharge: HOME | End: 2024-05-30
Attending: FAMILY MEDICINE
Payer: MEDICAID

## 2024-05-30 ENCOUNTER — TELEPHONE (OUTPATIENT)
Dept: FAMILY MEDICINE | Facility: CLINIC | Age: 59
End: 2024-05-30
Payer: MEDICAID

## 2024-05-30 DIAGNOSIS — Z87.891 PERSONAL HISTORY OF SMOKING: ICD-10-CM

## 2024-05-30 PROCEDURE — 71271 CT THORAX LUNG CANCER SCR C-: CPT | Mod: TC

## 2024-05-30 NOTE — TELEPHONE ENCOUNTER
----- Message from Gallo Jasso MD sent at 5/30/2024  3:21 PM CDT -----  Normal lung cancer screening.  Repeat in 1 year

## 2024-11-13 ENCOUNTER — OFFICE VISIT (OUTPATIENT)
Dept: FAMILY MEDICINE | Facility: CLINIC | Age: 59
End: 2024-11-13
Payer: MEDICAID

## 2024-11-13 VITALS
SYSTOLIC BLOOD PRESSURE: 136 MMHG | OXYGEN SATURATION: 100 % | BODY MASS INDEX: 31.47 KG/M2 | RESPIRATION RATE: 18 BRPM | TEMPERATURE: 97 F | HEIGHT: 62 IN | WEIGHT: 171 LBS | DIASTOLIC BLOOD PRESSURE: 84 MMHG | HEART RATE: 58 BPM

## 2024-11-13 DIAGNOSIS — I10 HYPERTENSION, UNSPECIFIED TYPE: Primary | ICD-10-CM

## 2024-11-13 DIAGNOSIS — E78.49 OTHER HYPERLIPIDEMIA: ICD-10-CM

## 2024-11-13 PROCEDURE — 1160F RVW MEDS BY RX/DR IN RCRD: CPT | Mod: CPTII,,, | Performed by: FAMILY MEDICINE

## 2024-11-13 PROCEDURE — 3079F DIAST BP 80-89 MM HG: CPT | Mod: CPTII,,, | Performed by: FAMILY MEDICINE

## 2024-11-13 PROCEDURE — 99214 OFFICE O/P EST MOD 30 MIN: CPT | Mod: ,,, | Performed by: FAMILY MEDICINE

## 2024-11-13 PROCEDURE — 1159F MED LIST DOCD IN RCRD: CPT | Mod: CPTII,,, | Performed by: FAMILY MEDICINE

## 2024-11-13 PROCEDURE — 3075F SYST BP GE 130 - 139MM HG: CPT | Mod: CPTII,,, | Performed by: FAMILY MEDICINE

## 2024-11-13 PROCEDURE — 4010F ACE/ARB THERAPY RXD/TAKEN: CPT | Mod: CPTII,,, | Performed by: FAMILY MEDICINE

## 2024-11-13 PROCEDURE — 3008F BODY MASS INDEX DOCD: CPT | Mod: CPTII,,, | Performed by: FAMILY MEDICINE

## 2024-11-13 RX ORDER — CARVEDILOL 3.12 MG/1
3.12 TABLET ORAL DAILY
Qty: 90 TABLET | Refills: 1 | Status: SHIPPED | OUTPATIENT
Start: 2024-11-13 | End: 2025-05-12

## 2024-11-13 RX ORDER — ROSUVASTATIN CALCIUM 20 MG/1
20 TABLET, COATED ORAL NIGHTLY
Qty: 90 TABLET | Refills: 1 | Status: SHIPPED | OUTPATIENT
Start: 2024-11-13 | End: 2025-05-12

## 2024-11-13 RX ORDER — LISINOPRIL 10 MG/1
10 TABLET ORAL DAILY
Qty: 90 TABLET | Refills: 1 | Status: SHIPPED | OUTPATIENT
Start: 2024-11-13 | End: 2025-05-12

## 2024-11-13 NOTE — PROGRESS NOTES
"Subjective:      Patient ID: Lorraine Yanes is a 59 y.o. female.    Chief Complaint: 6 month f/u      Routine    Hypertension  - tolerating medication (no side effects)  - no headaches  - patient without any complaints    Hyperlipidemia  - tolerating medication (no side effects)   - no myalgia  - watching diet    Review of Systems   Constitutional: Negative.    Respiratory: Negative.     Cardiovascular: Negative.    Gastrointestinal: Negative.    Psychiatric/Behavioral: Negative.           Objective:     /84   Pulse (!) 58   Temp 97 °F (36.1 °C) (Temporal)   Resp 18   Ht 5' 1.81" (1.57 m)   Wt 77.6 kg (171 lb)   SpO2 100%   BMI 31.47 kg/m²    Physical Exam  Constitutional:       Appearance: Normal appearance.   Cardiovascular:      Rate and Rhythm: Normal rate and regular rhythm.      Heart sounds: Normal heart sounds.   Pulmonary:      Effort: Pulmonary effort is normal.      Breath sounds: Normal breath sounds.   Neurological:      Mental Status: She is alert.   Psychiatric:         Mood and Affect: Mood normal.         Behavior: Behavior normal.         Thought Content: Thought content normal.         Judgment: Judgment normal.             Assessment:     Problem List Items Addressed This Visit          Cardiac/Vascular    Other hyperlipidemia    Relevant Medications    rosuvastatin (CRESTOR) 20 MG tablet    Hypertension - Primary    Relevant Medications    carvediloL (COREG) 3.125 MG tablet    lisinopriL 10 MG tablet        Plan:   1. Hypertension, unspecified type  -     carvediloL (COREG) 3.125 MG tablet; Take 1 tablet (3.125 mg total) by mouth once daily.  Dispense: 90 tablet; Refill: 1  -     lisinopriL 10 MG tablet; Take 1 tablet (10 mg total) by mouth once daily.  Dispense: 90 tablet; Refill: 1  Controlled  Continue current Rx medication  Return to clinic with any concerns    2. Other hyperlipidemia  -     rosuvastatin (CRESTOR) 20 MG tablet; Take 1 tablet (20 mg total) by mouth every evening.  " Dispense: 90 tablet; Refill: 1  Continue current Rx medication  FLP with next lab work  Return to clinic with any concerns

## 2025-05-01 DIAGNOSIS — Z87.891 PERSONAL HISTORY OF TOBACCO USE, PRESENTING HAZARDS TO HEALTH: ICD-10-CM

## 2025-05-01 DIAGNOSIS — Z12.2 SCREENING FOR MALIGNANT NEOPLASM OF RESPIRATORY ORGAN: Primary | ICD-10-CM

## 2025-05-07 DIAGNOSIS — Z00.00 WELLNESS EXAMINATION: Primary | ICD-10-CM

## 2025-05-07 DIAGNOSIS — I10 HYPERTENSION, UNSPECIFIED TYPE: ICD-10-CM

## 2025-05-07 DIAGNOSIS — E78.49 OTHER HYPERLIPIDEMIA: ICD-10-CM

## 2025-05-15 ENCOUNTER — LAB VISIT (OUTPATIENT)
Dept: LAB | Facility: HOSPITAL | Age: 60
End: 2025-05-15
Attending: FAMILY MEDICINE
Payer: MEDICAID

## 2025-05-15 DIAGNOSIS — I10 HYPERTENSION, UNSPECIFIED TYPE: ICD-10-CM

## 2025-05-15 DIAGNOSIS — Z00.00 WELLNESS EXAMINATION: ICD-10-CM

## 2025-05-15 DIAGNOSIS — E78.49 OTHER HYPERLIPIDEMIA: ICD-10-CM

## 2025-05-15 LAB
ALBUMIN SERPL-MCNC: 3.8 G/DL (ref 3.4–4.8)
ALBUMIN/GLOB SERPL: 1.1 RATIO (ref 1.1–2)
ALP SERPL-CCNC: 141 UNIT/L (ref 40–150)
ALT SERPL-CCNC: 33 UNIT/L (ref 0–55)
ANION GAP SERPL CALC-SCNC: 9 MEQ/L
AST SERPL-CCNC: 21 UNIT/L (ref 11–45)
BASOPHILS # BLD AUTO: 0.05 X10(3)/MCL
BASOPHILS NFR BLD AUTO: 0.6 %
BILIRUB SERPL-MCNC: 0.3 MG/DL
BUN SERPL-MCNC: 13 MG/DL (ref 9.8–20.1)
CALCIUM SERPL-MCNC: 9.2 MG/DL (ref 8.4–10.2)
CHLORIDE SERPL-SCNC: 104 MMOL/L (ref 98–107)
CHOLEST SERPL-MCNC: 277 MG/DL
CHOLEST/HDLC SERPL: 6 {RATIO} (ref 0–5)
CO2 SERPL-SCNC: 28 MMOL/L (ref 23–31)
CREAT SERPL-MCNC: 0.73 MG/DL (ref 0.55–1.02)
CREAT/UREA NIT SERPL: 18
EOSINOPHIL # BLD AUTO: 0.12 X10(3)/MCL (ref 0–0.9)
EOSINOPHIL NFR BLD AUTO: 1.4 %
ERYTHROCYTE [DISTWIDTH] IN BLOOD BY AUTOMATED COUNT: 12.9 % (ref 11.5–17)
GFR SERPLBLD CREATININE-BSD FMLA CKD-EPI: >60 ML/MIN/1.73/M2
GLOBULIN SER-MCNC: 3.6 GM/DL (ref 2.4–3.5)
GLUCOSE SERPL-MCNC: 110 MG/DL (ref 82–115)
HCT VFR BLD AUTO: 41.8 % (ref 37–47)
HDLC SERPL-MCNC: 47 MG/DL (ref 35–60)
HGB BLD-MCNC: 13.4 G/DL (ref 12–16)
IMM GRANULOCYTES # BLD AUTO: 0.01 X10(3)/MCL (ref 0–0.04)
IMM GRANULOCYTES NFR BLD AUTO: 0.1 %
LDLC SERPL CALC-MCNC: 190 MG/DL (ref 50–140)
LYMPHOCYTES # BLD AUTO: 3.84 X10(3)/MCL (ref 0.6–4.6)
LYMPHOCYTES NFR BLD AUTO: 44.9 %
MCH RBC QN AUTO: 29 PG (ref 27–31)
MCHC RBC AUTO-ENTMCNC: 32.1 G/DL (ref 33–36)
MCV RBC AUTO: 90.5 FL (ref 80–94)
MONOCYTES # BLD AUTO: 0.34 X10(3)/MCL (ref 0.1–1.3)
MONOCYTES NFR BLD AUTO: 4 %
NEUTROPHILS # BLD AUTO: 4.2 X10(3)/MCL (ref 2.1–9.2)
NEUTROPHILS NFR BLD AUTO: 49 %
NRBC BLD AUTO-RTO: 0 %
PLATELET # BLD AUTO: 276 X10(3)/MCL (ref 130–400)
PMV BLD AUTO: 10.2 FL (ref 7.4–10.4)
POTASSIUM SERPL-SCNC: 4.3 MMOL/L (ref 3.5–5.1)
PROT SERPL-MCNC: 7.4 GM/DL (ref 5.8–7.6)
RBC # BLD AUTO: 4.62 X10(6)/MCL (ref 4.2–5.4)
SODIUM SERPL-SCNC: 141 MMOL/L (ref 136–145)
TRIGL SERPL-MCNC: 199 MG/DL (ref 37–140)
VLDLC SERPL CALC-MCNC: 40 MG/DL
WBC # BLD AUTO: 8.56 X10(3)/MCL (ref 4.5–11.5)

## 2025-05-15 PROCEDURE — 80061 LIPID PANEL: CPT

## 2025-05-15 PROCEDURE — 80053 COMPREHEN METABOLIC PANEL: CPT

## 2025-05-15 PROCEDURE — 85025 COMPLETE CBC W/AUTO DIFF WBC: CPT

## 2025-05-15 PROCEDURE — 36415 COLL VENOUS BLD VENIPUNCTURE: CPT

## 2025-06-02 ENCOUNTER — HOSPITAL ENCOUNTER (OUTPATIENT)
Dept: RADIOLOGY | Facility: HOSPITAL | Age: 60
Discharge: HOME | End: 2025-06-02
Attending: FAMILY MEDICINE
Payer: MEDICAID

## 2025-06-02 DIAGNOSIS — Z87.891 PERSONAL HISTORY OF TOBACCO USE, PRESENTING HAZARDS TO HEALTH: ICD-10-CM

## 2025-06-02 PROCEDURE — 71271 CT THORAX LUNG CANCER SCR C-: CPT | Mod: TC

## 2025-06-03 ENCOUNTER — TELEPHONE (OUTPATIENT)
Dept: FAMILY MEDICINE | Facility: CLINIC | Age: 60
End: 2025-06-03
Payer: MEDICAID

## 2025-06-03 ENCOUNTER — RESULTS FOLLOW-UP (OUTPATIENT)
Dept: FAMILY MEDICINE | Facility: CLINIC | Age: 60
End: 2025-06-03

## 2025-06-06 ENCOUNTER — HOSPITAL ENCOUNTER (EMERGENCY)
Facility: HOSPITAL | Age: 60
Discharge: HOME | End: 2025-06-06
Attending: FAMILY MEDICINE
Payer: MEDICAID

## 2025-06-06 VITALS
RESPIRATION RATE: 15 BRPM | BODY MASS INDEX: 31.89 KG/M2 | DIASTOLIC BLOOD PRESSURE: 94 MMHG | TEMPERATURE: 97 F | OXYGEN SATURATION: 100 % | WEIGHT: 180 LBS | HEART RATE: 61 BPM | HEIGHT: 63 IN | SYSTOLIC BLOOD PRESSURE: 165 MMHG

## 2025-06-06 DIAGNOSIS — I10 PRIMARY HYPERTENSION: ICD-10-CM

## 2025-06-06 DIAGNOSIS — B34.9 ACUTE VIRAL SYNDROME: Primary | ICD-10-CM

## 2025-06-06 DIAGNOSIS — I10 HYPERTENSION: ICD-10-CM

## 2025-06-06 LAB
ALBUMIN SERPL-MCNC: 3.7 G/DL (ref 3.4–4.8)
ALBUMIN/GLOB SERPL: 1.1 RATIO (ref 1.1–2)
ALP SERPL-CCNC: 144 UNIT/L (ref 40–150)
ALT SERPL-CCNC: 48 UNIT/L (ref 0–55)
ANION GAP SERPL CALC-SCNC: 11 MEQ/L
AST SERPL-CCNC: 39 UNIT/L (ref 11–45)
BASOPHILS # BLD AUTO: 0.03 X10(3)/MCL
BASOPHILS NFR BLD AUTO: 0.4 %
BILIRUB SERPL-MCNC: 0.3 MG/DL
BUN SERPL-MCNC: 14 MG/DL (ref 9.8–20.1)
CALCIUM SERPL-MCNC: 9.2 MG/DL (ref 8.4–10.2)
CHLORIDE SERPL-SCNC: 108 MMOL/L (ref 98–107)
CO2 SERPL-SCNC: 25 MMOL/L (ref 23–31)
CREAT SERPL-MCNC: 0.68 MG/DL (ref 0.55–1.02)
CREAT/UREA NIT SERPL: 21
EOSINOPHIL # BLD AUTO: 0.1 X10(3)/MCL (ref 0–0.9)
EOSINOPHIL NFR BLD AUTO: 1.4 %
ERYTHROCYTE [DISTWIDTH] IN BLOOD BY AUTOMATED COUNT: 12.8 % (ref 11.5–17)
GFR SERPLBLD CREATININE-BSD FMLA CKD-EPI: >60 ML/MIN/1.73/M2
GLOBULIN SER-MCNC: 3.5 GM/DL (ref 2.4–3.5)
GLUCOSE SERPL-MCNC: 113 MG/DL (ref 82–115)
HCT VFR BLD AUTO: 38.8 % (ref 37–47)
HGB BLD-MCNC: 12.8 G/DL (ref 12–16)
IMM GRANULOCYTES # BLD AUTO: 0.03 X10(3)/MCL (ref 0–0.04)
IMM GRANULOCYTES NFR BLD AUTO: 0.4 %
LYMPHOCYTES # BLD AUTO: 3.94 X10(3)/MCL (ref 0.6–4.6)
LYMPHOCYTES NFR BLD AUTO: 54.9 %
MCH RBC QN AUTO: 29.4 PG (ref 27–31)
MCHC RBC AUTO-ENTMCNC: 33 G/DL (ref 33–36)
MCV RBC AUTO: 89.2 FL (ref 80–94)
MONOCYTES # BLD AUTO: 0.35 X10(3)/MCL (ref 0.1–1.3)
MONOCYTES NFR BLD AUTO: 4.9 %
NEUTROPHILS # BLD AUTO: 2.73 X10(3)/MCL (ref 2.1–9.2)
NEUTROPHILS NFR BLD AUTO: 38 %
NRBC BLD AUTO-RTO: 0 %
PLATELET # BLD AUTO: 275 X10(3)/MCL (ref 130–400)
PMV BLD AUTO: 10.2 FL (ref 7.4–10.4)
POTASSIUM SERPL-SCNC: 3.9 MMOL/L (ref 3.5–5.1)
PROT SERPL-MCNC: 7.2 GM/DL (ref 5.8–7.6)
RBC # BLD AUTO: 4.35 X10(6)/MCL (ref 4.2–5.4)
SODIUM SERPL-SCNC: 144 MMOL/L (ref 136–145)
TROPONIN I SERPL-MCNC: <0.01 NG/ML (ref 0–0.04)
WBC # BLD AUTO: 7.18 X10(3)/MCL (ref 4.5–11.5)

## 2025-06-06 PROCEDURE — 99285 EMERGENCY DEPT VISIT HI MDM: CPT | Mod: 25

## 2025-06-06 PROCEDURE — 85025 COMPLETE CBC W/AUTO DIFF WBC: CPT | Performed by: FAMILY MEDICINE

## 2025-06-06 PROCEDURE — 93005 ELECTROCARDIOGRAM TRACING: CPT

## 2025-06-06 PROCEDURE — 84484 ASSAY OF TROPONIN QUANT: CPT | Performed by: FAMILY MEDICINE

## 2025-06-06 PROCEDURE — 25000003 PHARM REV CODE 250: Performed by: FAMILY MEDICINE

## 2025-06-06 PROCEDURE — 80053 COMPREHEN METABOLIC PANEL: CPT | Performed by: FAMILY MEDICINE

## 2025-06-06 RX ORDER — ONDANSETRON 4 MG/1
4 TABLET, FILM COATED ORAL EVERY 6 HOURS
Qty: 12 TABLET | Refills: 0 | Status: SHIPPED | OUTPATIENT
Start: 2025-06-06

## 2025-06-06 RX ORDER — ONDANSETRON 4 MG/1
4 TABLET, ORALLY DISINTEGRATING ORAL
Status: COMPLETED | OUTPATIENT
Start: 2025-06-06 | End: 2025-06-06

## 2025-06-06 RX ADMIN — ONDANSETRON 4 MG: 4 TABLET, ORALLY DISINTEGRATING ORAL at 10:06

## 2025-06-06 NOTE — ED PROVIDER NOTES
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 They checked her b/p which was high, coming in for hypertension. 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  Patient states that she has felt very dizzy when she bent down to throughout the garbage.  At the same time they checked her blood pressure and it was very high.  Patient felt very nauseous but never vomited.    The history is provided by the patient.     Review of patient's allergies indicates:  No Known Allergies  Past Medical History:   Diagnosis Date    Abnormal mammogram of right breast     Broken heart syndrome     HPV in female     Hyperlipidemia     Hypertension     Plantar fasciitis      Past Surgical History:   Procedure Laterality Date    APPENDECTOMY      TONSILLECTOMY      TOTAL ABDOMINAL HYSTERECTOMY      2 years ago     Family History   Problem Relation Name Age of Onset    Heart attack Mother      Hypertension Father       Social History[1]  Review of Systems   Constitutional: Negative.    HENT: Negative.     Respiratory: Negative.     Cardiovascular: Negative.    Gastrointestinal:  Positive for nausea.   Endocrine: Negative.    Neurological:  Positive for dizziness.   Psychiatric/Behavioral: Negative.     All other systems reviewed and are negative.      Physical Exam     Initial Vitals [06/06/25 0916]   BP Pulse Resp Temp SpO2   (!) 165/88 64 20 96.8 °F (36 °C) 97 %      MAP       --         Physical Exam    Nursing note and vitals reviewed.  Constitutional: She appears well-developed.   HENT:   Head: Normocephalic.   Eyes: Pupils are equal, round, and reactive to light.   Neck:   Normal range of motion.  Cardiovascular:  Normal rate, regular rhythm and normal heart sounds.           Pulmonary/Chest: Breath sounds normal.   Abdominal: Abdomen is soft. Bowel sounds are normal.    Musculoskeletal:         General: Normal range of motion.      Cervical back: Normal range of motion.     Neurological: She is alert and oriented to person, place, and time. GCS score is 15. GCS eye subscore is 4. GCS verbal subscore is 5. GCS motor subscore is 6.   Patient feels dizzy but she is awake alert and oriented able to sit up without any issues   Skin: Skin is warm and dry. Capillary refill takes less than 2 seconds.   Psychiatric: She has a normal mood and affect.         ED Course   Procedures  Labs Reviewed   COMPREHENSIVE METABOLIC PANEL - Abnormal       Result Value    Sodium 144      Potassium 3.9      Chloride 108 (*)     CO2 25      Glucose 113      Blood Urea Nitrogen 14.0      Creatinine 0.68      Calcium 9.2      Protein Total 7.2      Albumin 3.7      Globulin 3.5      Albumin/Globulin Ratio 1.1      Bilirubin Total 0.3            ALT 48      AST 39      eGFR >60      Anion Gap 11.0      BUN/Creatinine Ratio 21     TROPONIN I - Normal    Troponin-I <0.010     CBC W/ AUTO DIFFERENTIAL    Narrative:     The following orders were created for panel order CBC auto differential.  Procedure                               Abnormality         Status                     ---------                               -----------         ------                     CBC with Differential[[id number]]                           Final result                 Please view results for these tests on the individual orders.   CBC WITH DIFFERENTIAL    WBC 7.18      RBC 4.35      Hgb 12.8      Hct 38.8      MCV 89.2      MCH 29.4      MCHC 33.0      RDW 12.8      Platelet 275      MPV 10.2      Neut % 38.0      Lymph % 54.9      Mono % 4.9      Eos % 1.4      Basophil % 0.4      Imm Grans % 0.4      Neut # 2.73      Lymph # 3.94      Mono # 0.35      Eos # 0.10      Baso # 0.03      Imm Gran # 0.03      NRBC% 0.0       EKG Readings: (Independently Interpreted)   Rhythm: Sinus Arrhythmia. Heart Rate: 67. Ectopy: No  Ectopy. Conduction: Normal. ST Segments: Normal ST Segments. Clinical Impression: Sinus Arrhythmia     ECG Results              EKG 12-lead (In process)        Collection Time Result Time QRS Duration [facility] QTC Calculation    06/06/25 09:16:45 06/06/25 09:34:52 96 441                     In process by Interface, Lab In [facility] (06/06/25 09:34:55)                   Narrative:    Test Reason : I10,    Vent. Rate :  67 BPM     Atrial Rate :  67 BPM     P-R Int : 120 ms          QRS Dur :  96 ms      QT Int : 418 ms       P-R-T Axes :  19  13  43 degrees    QTcB Int : 441 ms    Normal sinus rhythm with sinus arrhythmia  Normal ECG  No previous ECGs available    Referred By: AAAREFERRAL SELF           Confirmed By:                                   Imaging Results              CT Head Without Contrast (Final result)  Result time 06/06/25 10:39:24      Final result by Jan Sanchez MD (06/06/25 10:39:24)                   Impression:      No acute intracranial abnormality.      Electronically signed by: Jan Sanchez MD  Date:    06/06/2025  Time:    10:39               Narrative:    EXAMINATION:  CT HEAD WITHOUT CONTRAST    CLINICAL HISTORY:  Dizziness, persistent/recurrent, cardiac or vascular cause suspected;    TECHNIQUE:  Axial images of the head were obtained without IV contrast administration.  Coronal and sagittal reconstructions were provided.  Three dimensional and MIP images were obtained and evaluated.  Total DLP was 858 mGy-cm. Dose lowering technique and automated exposure control were utilized for this exam.    COMPARISON:  None    FINDINGS:  There is normal brain formation.  There is normal gray-white matter differentiation.  There is no hemorrhage, hydrocephalus, or midline shift.  There is no cytotoxic or vasogenic edema.  There is no intra or extra-axial fluid collection.  There is no herniation.    The calvarium is intact.  There is no fracture.  The bilateral orbits are normal.  The paranasal sinuses and  mastoid air cells are normally developed and free of disease.                                       X-Ray Chest AP Portable (Final result)  Result time 06/06/25 10:29:43      Final result by Jaime Franco MD (06/06/25 10:29:43)                   Impression:      No acute findings.      Electronically signed by: Jaime Franco  Date:    06/06/2025  Time:    10:29               Narrative:    EXAMINATION:  XR CHEST AP PORTABLE    CLINICAL HISTORY:  Hypertension.Chest Pain;    COMPARISON:  22 October 2019    FINDINGS:  Frontal view of the chest was obtained. Heart is not enlarged.  Grossly clear lungs.  No pneumothorax.  There are old left rib deformities.                                       Medications   ondansetron disintegrating tablet 4 mg (4 mg Oral Given 6/6/25 1010)     Medical Decision Making  This patient is a 59-year-old female who works at the nursing home, states she was cleaning up her room and felt dizzy suddenly and very nauseous.  They took her blood pressure and it was very high.  They have had several episodes of nausea vomiting diarrhea in the patient population an over the past few days  Differential diagnosis: Viral syndrome, viral gastroenteritis, food poisoning, CVA      Amount and/or Complexity of Data Reviewed  External Data Reviewed: labs.     Details: Patient's lab work shows that she has a chloride of 108, troponin is negative at less than 0.010, CBC is normal  Labs: ordered.     Details: CT of the head without contrast shows no acute intracranial abnormality, chest x-ray is normal  Radiology: ordered.    Risk  Prescription drug management.                                      Clinical Impression:  Final diagnoses:  [I10] Hypertension  [I10] Primary hypertension  [B34.9] Acute viral syndrome (Primary)          ED Disposition Condition    Discharge Stable          ED Prescriptions       Medication Sig Dispense Start Date End Date Auth. Provider    ondansetron (ZOFRAN) 4 MG tablet Take 1  tablet (4 mg total) by mouth every 6 (six) hours. 12 tablet 6/6/2025 -- Mohit Carbajal MD          Follow-up Information       Follow up With Specialties Details Why Contact Info    Gallo Jasso MD Family Medicine Schedule an appointment as soon as possible for a visit in 2 days  [address] [place] [address]  [phone]                     [1]   Social History  Tobacco Use    Smoking status: Every Day     Current packs/day: 0.50     Average packs/day: 0.5 packs/day for 40.0 years (20.0 ttl pk-yrs)     Types: Cigarettes    Smokeless tobacco: Never   Substance Use Topics    Alcohol use: Never    Drug use: Never        Mohit Carbajal MD  06/06/25 8955

## 2025-06-06 NOTE — ED NOTES
Patient brought to ER via wheelchair.  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  Upon arrival, pt's blood pressure was stable and pt is less dizzy.  Denies any pain.

## 2025-06-06 NOTE — Clinical Note
"Lorraine Rudolphbrian Yanes was seen and treated in our emergency department on 6/6/2025.  She may return to work on 06/09/2025.       If you have any questions or concerns, please don't hesitate to call.      Mohit Carbajal MD"

## 2025-06-07 LAB
OHS QRS DURATION: 96 MS
OHS QTC CALCULATION: 441 MS

## 2025-06-09 ENCOUNTER — OFFICE VISIT (OUTPATIENT)
Dept: FAMILY MEDICINE | Facility: CLINIC | Age: 60
End: 2025-06-09
Payer: MEDICAID

## 2025-06-09 VITALS
HEIGHT: 63 IN | SYSTOLIC BLOOD PRESSURE: 132 MMHG | TEMPERATURE: 97 F | OXYGEN SATURATION: 99 % | DIASTOLIC BLOOD PRESSURE: 72 MMHG | HEART RATE: 72 BPM | BODY MASS INDEX: 30.83 KG/M2 | RESPIRATION RATE: 16 BRPM | WEIGHT: 174 LBS

## 2025-06-09 DIAGNOSIS — Z00.00 WELLNESS EXAMINATION: Primary | ICD-10-CM

## 2025-06-09 DIAGNOSIS — I10 HYPERTENSION, UNSPECIFIED TYPE: ICD-10-CM

## 2025-06-09 DIAGNOSIS — Z12.31 SCREENING MAMMOGRAM, ENCOUNTER FOR: ICD-10-CM

## 2025-06-09 DIAGNOSIS — E78.49 OTHER HYPERLIPIDEMIA: ICD-10-CM

## 2025-06-09 RX ORDER — CARVEDILOL 3.12 MG/1
3.12 TABLET ORAL DAILY
Qty: 90 TABLET | Refills: 3 | Status: SHIPPED | OUTPATIENT
Start: 2025-06-09

## 2025-06-09 RX ORDER — ROSUVASTATIN CALCIUM 20 MG/1
20 TABLET, COATED ORAL NIGHTLY
Qty: 90 TABLET | Refills: 3 | Status: SHIPPED | OUTPATIENT
Start: 2025-06-09

## 2025-06-09 RX ORDER — LISINOPRIL 10 MG/1
10 TABLET ORAL DAILY
Qty: 90 TABLET | Refills: 3 | Status: SHIPPED | OUTPATIENT
Start: 2025-06-09

## 2025-06-09 NOTE — ASSESSMENT & PLAN NOTE
Lab work discussed with patient  Continue current medication  Continue diet/exercise  Return to clinic with any concerns

## 2025-06-09 NOTE — PROGRESS NOTES
"Subjective:     Patient ID: Lorraine Yanes is a 59 y.o. female.    Chief Complaint: Annual Exam        History of Present Illness    CHIEF COMPLAINT:  Patient presents today for a wellness visit and medication refills.    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 The dizziness occurred after entering three consecutive warm rooms. Her blood pressure was elevated at 200/110 during the episode but normalized to 110/78 after returning home. She denies any persistent dizziness since the incident.    LABS:  Cholesterol panel from previous month shows elevated total cholesterol of 277 (increased from 183), elevated LDL of 190 (increased from 112), elevated triglycerides of 199 (increased from 108), and HDL of 47. CBC was normal with adequate platelets and white blood cell count. Blood sugar was 113. Kidney and liver function tests were normal.    SURGICAL HISTORY:  History of hysterectomy.      ROS:  Neurological: +dizziness            Review of Systems   Constitutional: Negative.    Respiratory: Negative.     Cardiovascular: Negative.    Gastrointestinal: Negative.    Psychiatric/Behavioral: Negative.         Objective:     /72   Pulse 72   Temp 97.3 °F (36.3 °C) (Temporal)   Resp 16   Ht 5' 2.99" (1.6 m)   Wt 78.9 kg (174 lb)   SpO2 99%   BMI 30.83 kg/m²    Physical Exam  Constitutional:       Appearance: Normal appearance.   Cardiovascular:      Rate and Rhythm: Normal rate and regular rhythm.   Pulmonary:      Effort: Pulmonary effort is normal.      Breath sounds: Normal breath sounds.   Abdominal:      General: Abdomen is flat. Bowel sounds are normal.      Palpations: Abdomen is soft.   Neurological:      Mental Status: She is alert.   Psychiatric:         Mood and Affect: Mood normal.         Behavior: Behavior normal.         Thought Content: Thought content normal.         Judgment: " Judgment normal.       Recent Results (from the past 3 weeks)   EKG 12-lead    Collection Time: 06/06/25  9:16 AM   Result Value Ref Range    QRS Duration 96 ms    OHS QTC Calculation 441 ms   Comprehensive metabolic panel    Collection Time: 06/06/25  9:44 AM   Result Value Ref Range    Sodium 144 136 - 145 mmol/L    Potassium 3.9 3.5 - 5.1 mmol/L    Chloride 108 (H) 98 - 107 mmol/L    CO2 25 23 - 31 mmol/L    Glucose 113 82 - 115 mg/dL    Blood Urea Nitrogen 14.0 9.8 - 20.1 mg/dL    Creatinine 0.68 0.55 - 1.02 mg/dL    Calcium 9.2 8.4 - 10.2 mg/dL    Protein Total 7.2 5.8 - 7.6 gm/dL    Albumin 3.7 3.4 - 4.8 g/dL    Globulin 3.5 2.4 - 3.5 gm/dL    Albumin/Globulin Ratio 1.1 1.1 - 2.0 ratio    Bilirubin Total 0.3 <=1.5 mg/dL     40 - 150 unit/L    ALT 48 0 - 55 unit/L    AST 39 11 - 45 unit/L    eGFR >60 mL/min/1.73/m2    Anion Gap 11.0 mEq/L    BUN/Creatinine Ratio 21    Troponin I #1    Collection Time: 06/06/25  9:44 AM   Result Value Ref Range    Troponin-I <0.010 0.000 - 0.045 ng/mL   CBC with Differential    Collection Time: 06/06/25  9:44 AM   Result Value Ref Range    WBC 7.18 4.50 - 11.50 x10(3)/mcL    RBC 4.35 4.20 - 5.40 x10(6)/mcL    Hgb 12.8 12.0 - 16.0 g/dL    Hct 38.8 37.0 - 47.0 %    MCV 89.2 80.0 - 94.0 fL    MCH 29.4 27.0 - 31.0 pg    MCHC 33.0 33.0 - 36.0 g/dL    RDW 12.8 11.5 - 17.0 %    Platelet 275 130 - 400 x10(3)/mcL    MPV 10.2 7.4 - 10.4 fL    Neut % 38.0 %    Lymph % 54.9 %    Mono % 4.9 %    Eos % 1.4 %    Basophil % 0.4 %    Imm Grans % 0.4 %    Neut # 2.73 2.1 - 9.2 x10(3)/mcL    Lymph # 3.94 0.6 - 4.6 x10(3)/mcL    Mono # 0.35 0.1 - 1.3 x10(3)/mcL    Eos # 0.10 0 - 0.9 x10(3)/mcL    Baso # 0.03 <=0.2 x10(3)/mcL    Imm Gran # 0.03 0.00 - 0.04 x10(3)/mcL    NRBC% 0.0 %            Assessment:     Problem List Items Addressed This Visit          Cardiac/Vascular    Other hyperlipidemia    Current Assessment & Plan   Lab work discussed with patient   Restart Crestor 20 mg q.h.s.    Diet modification   FLP/LFT prior to next visit           Relevant Medications    rosuvastatin (CRESTOR) 20 MG tablet    Other Relevant Orders    Lipid Panel    Hepatic Function Panel    Hypertension    Current Assessment & Plan   Controlled  Continue current Rx medication (lisinopril/carvedilol)  Return to clinic with any concerns             Relevant Medications    carvediloL (COREG) 3.125 MG tablet    lisinopriL 10 MG tablet       Other    Wellness examination - Primary    Current Assessment & Plan   Lab work discussed with patient  Continue current medication  Continue diet/exercise  Return to clinic with any concerns            Other Visit Diagnoses         Screening mammogram, encounter for                 Plan:   1. Wellness examination  Assessment & Plan:  Lab work discussed with patient  Continue current medication  Continue diet/exercise  Return to clinic with any concerns        2. Hypertension, unspecified type  Assessment & Plan:  Controlled  Continue current Rx medication (lisinopril/carvedilol)  Return to clinic with any concerns        Orders:  -     carvediloL (COREG) 3.125 MG tablet; Take 1 tablet (3.125 mg total) by mouth once daily.  Dispense: 90 tablet; Refill: 3  -     lisinopriL 10 MG tablet; Take 1 tablet (10 mg total) by mouth once daily.  Dispense: 90 tablet; Refill: 3    3. Other hyperlipidemia  Assessment & Plan:  Lab work discussed with patient   Restart Crestor 20 mg q.h.s.   Diet modification   FLP/LFT prior to next visit      Orders:  -     rosuvastatin (CRESTOR) 20 MG tablet; Take 1 tablet (20 mg total) by mouth every evening.  Dispense: 90 tablet; Refill: 3  -     Lipid Panel; Future; Expected date: 12/09/2025  -     Hepatic Function Panel; Future; Expected date: 12/09/2025    4. Screening mammogram, encounter for  Patient to schedule mammogram with Dr. Bucio                 This note was generated with the assistance of ambient listening technology. Verbal consent was  obtained by the patient and accompanying visitor(s) for the recording of patient appointment to facilitate this note. I attest to having reviewed and edited the generated note for accuracy, though some syntax or spelling errors may persist. Please contact the author of this note for any clarification.

## 2025-06-09 NOTE — ASSESSMENT & PLAN NOTE
Lab work discussed with patient   Restart Crestor 20 mg q.h.s.   Diet modification   FLP/LFT prior to next visit

## 2025-06-09 NOTE — ASSESSMENT & PLAN NOTE
Controlled  Continue current Rx medication (lisinopril/carvedilol)  Return to clinic with any concerns